# Patient Record
Sex: MALE | Race: WHITE
[De-identification: names, ages, dates, MRNs, and addresses within clinical notes are randomized per-mention and may not be internally consistent; named-entity substitution may affect disease eponyms.]

---

## 2019-06-03 ENCOUNTER — HOSPITAL ENCOUNTER (EMERGENCY)
Dept: HOSPITAL 74 - JER | Age: 44
LOS: 1 days | Discharge: HOME | End: 2019-06-04
Payer: SELF-PAY

## 2019-06-03 DIAGNOSIS — S91.214A: ICD-10-CM
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
## 2019-06-03 DIAGNOSIS — S91.211A: Primary | ICD-10-CM

## 2019-06-03 PROCEDURE — 3E033NZ INTRODUCTION OF ANALGESICS, HYPNOTICS, SEDATIVES INTO PERIPHERAL VEIN, PERCUTANEOUS APPROACH: ICD-10-PCS

## 2019-06-03 PROCEDURE — 0HQMXZZ REPAIR RIGHT FOOT SKIN, EXTERNAL APPROACH: ICD-10-PCS

## 2019-06-03 PROCEDURE — 3E0234Z INTRODUCTION OF SERUM, TOXOID AND VACCINE INTO MUSCLE, PERCUTANEOUS APPROACH: ICD-10-PCS

## 2019-06-03 PROCEDURE — 3E03329 INTRODUCTION OF OTHER ANTI-INFECTIVE INTO PERIPHERAL VEIN, PERCUTANEOUS APPROACH: ICD-10-PCS

## 2019-07-27 ENCOUNTER — HOSPITAL ENCOUNTER (EMERGENCY)
Dept: HOSPITAL 74 - JER | Age: 44
Discharge: ANOTHER HOSPITAL | End: 2019-07-27
Payer: SELF-PAY

## 2020-09-12 ENCOUNTER — HOSPITAL ENCOUNTER (EMERGENCY)
Dept: HOSPITAL 74 - JER | Age: 45
LOS: 1 days | Discharge: LEFT BEFORE DISCHARGE | End: 2020-09-13
Payer: COMMERCIAL

## 2020-09-12 VITALS — TEMPERATURE: 98.5 F

## 2020-09-12 VITALS — BODY MASS INDEX: 22.8 KG/M2

## 2020-09-12 DIAGNOSIS — R68.84: Primary | ICD-10-CM

## 2020-09-12 DIAGNOSIS — M27.2: ICD-10-CM

## 2020-09-12 PROCEDURE — 3E03329 INTRODUCTION OF OTHER ANTI-INFECTIVE INTO PERIPHERAL VEIN, PERCUTANEOUS APPROACH: ICD-10-PCS

## 2020-09-12 PROCEDURE — 3E023NZ INTRODUCTION OF ANALGESICS, HYPNOTICS, SEDATIVES INTO MUSCLE, PERCUTANEOUS APPROACH: ICD-10-PCS

## 2020-09-12 NOTE — PDOC
Attending Attestation





- Resident


Resident Name: Jeremiah Mckenzie





- ED Attending Attestation


I have performed the following: I have examined & evaluated the patient, The 

case was reviewed & discussed with the resident, I agree w/resident's findings &

plan





- HPI


HPI: 





09/13/20 00:29


Pt comes with large facial abscess on the left side of face.  He has a broken 

tooth posterior left mandible that was never pulled out. Now with infection. He 

has had 1 day of swelling.  The tooth originally broke in Feb.


Pt has no fever and no headache.


He has no PMHx.





- Physicial Exam


PE: 





09/13/20 00:31


Afebrile


exquisite tendeness of the left mandible


pt has slight pallor over the tooth #18





- Medical Decision Making





09/13/20 00:32


Pt will have IV abx, IV analgesics, labs and CT scan of his face to r/o large 

abscess pocket.


09/13/20 02:08


Labs are all WNL; pt is hangining out and talking on his cell phone


09/13/20 03:22


Patient Name: SUJEY VILLARREAL


THIS IS A PRELIMINARY REPORT


DATE OF SERVICE: 2020-09-13 02:19:13


IMAGES: 1915


EXAM: SOFT TISSUE NECK CT WITH CONTR


HISTORY: Left mandibular abscess


COMPARISON: None.


FINDINGS:


The visualized intracranial and intraorbital contents are normal. The mastoid 

air cells are well


aerated. There is moderate ethmoid sinus mucosal thickening and right sphenoid 

sinus and


bilateral maxillary sinus retention cysts or polyps.


Left perimandibular soft tissue edema likely represent cellulitis. There is a 4 

mm thick and 16 x 16


mm wide subperiosteal abscess.. There is no discrete sinus tract but there is 

minimal adjacent


dental disease.


Normal epiglottis and vocal cords. There is no parapharyngeal or retropharyngeal

space edema.


The salivary glands and thyroid glands are normal. There is no mass, suspicious 

adenopathy . The


lung apices are clear. The bones are normal.


IMPRESSION: 16 x 16 x 4 mm wide left perimandibular subperiosteal abscess and 

regional


cellulitis.


09/13/20 03:30


Pt will be transferred to [facility]


09/13/20 03:57


Pt refusing transfer. Understands that he has an abscess in the bone and that 

this requires IV abx and admission as well as transfer to [facility] for further eval.

Pt refusing admisison.  Wants to go. Will sign AMA. I will prescribe ABX for him





Discharge





- Discharge Information


Problems reviewed: Yes


Clinical Impression/Diagnosis: 


 Pain in lower jaw, Subperiosteal abscess of jaw





Condition: Stable


Disposition: AGAINST MEDICAL ADVICE





- Additional Discharge Information


Prescriptions: 


Amoxicillin/Potassium Clav [Augmentin 875-125 Tablet] 1 each PO BID 10 Days #20 

tablet





- Follow up/Referral


Referrals: 


Chelsy Martinez MD [Primary Care Provider] - 





- Patient Discharge Instructions


Patient Printed Discharge Instructions:  [facility] for Tooth Abscess


Additional Instructions: 


Today you were evaluated for jaw pain. Your CT scan show that you have a left 

perimandibular abscess that needs to be drained by a dental surgeon. You need to

go to a hospital that has dentists available for further care. We recommended 

that you be transferred directly for care, but you did not want to do this. At 

home, please continue to take Advil as needed for pain. We have sent a 

prescription for an antibiotic for you to take, please take it as instructed.





- Post Discharge Activity

## 2020-09-12 NOTE — PDOC
History of Present Illness





- General


Chief Complaint: Toothache


Stated Complaint: LEFT FACE SWELLING AND PAIN


Time Seen by Provider: 09/12/20 23:30


History Source: Patient, Old Records


Exam Limitations: No Limitations





- History of Present Illness


Initial Comments: 





09/12/20 23:31


Sujey Delaney is an otherwise healthy 45M presenting with left jaw pain.





has had toothache since February 2020


left lower jaw rear most molar


saw dentist who did some procedures that did not improve pain


unable to see new dentist due to covid-19


pain controlled on Q6hr Advil and CBD gummies


today pain unresponsive to medications, came to ED for eval





has left jaw/face swelling, very sensitive


sensation of pain near left ear


liquids only PO including soups this week, hard to chew


no chest pain, SOB, dysphagia/odynophagia, or fevers


no other symptoms other than pain





no prior PMH or PSH


NKDA





Past History





- Medical History


Allergies/Adverse Reactions: 


                                    Allergies











Allergy/AdvReac Type Severity Reaction Status Date / Time


 


No Known Allergies Allergy   Verified 07/27/19 14:20











Home Medications: 


Ambulatory Orders





Amoxicillin/Potassium Clav [Augmentin 875-125 Tablet] 1 each PO BID 10 Days #20 

tablet 09/13/20 








COPD: No





- Immunization History


Immunization Up to Date: No





- Psycho-Social/Smoking History


Smoking History: Never smoked


Have you smoked in the past 12 months: Yes


Number of Cigarettes Smoked Daily: 0





- Substance Abuse Hx (Audit-C & DAST Scrn)


How often the patient has a drink containing alcohol: Never


Score: In Men: 4 or > Positive; In Women: 3 or > Positive: 0


Screen Result (Pos requires Nsg. Audit-10AR): Negative


In the last yr the pt used illegal drug/Rx for NonMed reason: No


Score:  Yes response is considered Positive: 0


Screen Result (Positive result requires Nsg. DAST-10): Negative





**Review of Systems





- Review of Systems


Able to Perform ROS?: Yes


Constitutional: No: Symptoms Reported


HEENTM: Yes: Mouth Pain, Dental Problems, Mouth Swelling.  No: Difficulty 

Swallowing


Respiratory: No: Symptoms reported


Cardiac (ROS): No: Symptoms Reported


ABD/GI: No: Symptoms Reported


: No: Symptoms Reported


Integumentary: No: Symptoms Reported


Neurological: No: Symptoms reported


Endocrine: No: Symptoms Reported


Hematologic/Lymphatic: No: Symptoms Reported


All Other Systems: Reviewed and Negative





*Physical Exam





- Vital Signs


                                Last Vital Signs











Temp Pulse Resp BP Pulse Ox


 


 98.5 F   77   19   122/83   98 


 


 09/12/20 22:54  09/12/20 22:54  09/12/20 22:54  09/12/20 22:54  09/12/20 22:54














- Physical Exam


General Appearance: Yes: Nourished, Appropriately Dressed, Other (resting in 

bed, comfortable appearing, voice sounds normal).  No: Apparent Distress


HEENT: positive: EOMI, DARNELL, Normal Voice, Other (teeth appear grossly normal, 

tender to instrumentation of rear lower teeth).  negative: Normal ENT Inspection

(swelling to left face and jaw, very tender to palpation), Symmetrical, Pharynx 

Normal, Scleral Icterus (R), Scleral Icterus (L), Pharyngeal Erythema, Tonsillar

Exudate, Tonsillar Erythema


Neck: positive: Trachea midline, Normal Thyroid, Supple, Tender lateral (left 

side).  negative: Carotid bruit, Decreased range of motion, Stridor, Lym

phadenopathy (R), Lymphadenopathy (L)


Respiratory/Chest: positive: Lungs Clear, Normal Breath Sounds.  negative: Chest

Tender, Respiratory Distress, Accessory Muscle Use, Crackles, Rales, Rhonchi, 

Stridor, Wheezing


Cardiovascular: positive: Regular Rhythm, Regular Rate.  negative: Murmur


Gastrointestinal/Abdominal: positive: Normal Bowel Sounds, Flat, Soft.  

negative: Tender, Organomegaly, Pulsatile Mass, Guarding, Rebound


Musculoskeletal: positive: Normal Inspection.  negative: CVA Tenderness, 

Decreased Range of Motion, Vertebral Tenderness


Extremity: positive: Normal Capillary Refill, Normal Inspection, Normal Range of

Motion, Pelvis Stable.  negative: Tender, Delayed Capillary Refill, Pedal Edema,

Swelling


Integumentary: positive: Normal Color, Dry, Warm


Neurologic: positive: Fully Oriented, Alert, Normal Mood/Affect, Normal Response





ED Treatment Course





- LABORATORY


CBC & Chemistry Diagram: 


                                 09/13/20 00:35





                                 09/13/20 00:35





- RADIOLOGY


Radiograph Interpretation: 





09/13/20 03:24


Jose Waite MD wrote on Sep 13th, 2020 at 03:19 AM:


Referring Physician: ANIBAL OLGUIN


Patient Name: SUJEY DELANEY


THIS IS A PRELIMINARY REPORT


DATE OF SERVICE: 2020-09-13 02:19:13


IMAGES: 1915


EXAM: SOFT TISSUE NECK CT WITH CONTR


HISTORY: Left mandibular abscess


COMPARISON: None.


FINDINGS:


The visualized intracranial and intraorbital contents are normal. The mastoid 

air cells are well


aerated. There is moderate ethmoid sinus mucosal thickening and right sphenoid 

sinus and


bilateral maxillary sinus retention cysts or polyps.


Left perimandibular soft tissue edema likely represent cellulitis. There is a 4 

mm thick and 16 x 16


mm wide subperiosteal abscess.. There is no discrete sinus tract but there is 

minimal adjacent


dental disease.


Normal epiglottis and vocal cords. There is no parapharyngeal or retropharyngeal

space edema.


The salivary glands and thyroid glands are normal. There is no mass, suspicious 

adenopathy . The


lung apices are clear. The bones are normal.


IMPRESSION: 16 x 16 x 4 mm wide left perimandibular subperiosteal abscess and 

regional


cellulitis.





Medical Decision Making





- Medical Decision Making





09/12/20 23:31


Patient has left-sided lower jaw toothache and swelling after ~7 months 

untreated dental infection, concerned for neck soft tissue infection vs. 

abscess, no systemic symptoms, VSS. Left lower jaw tender/swollen but no obvious

fluid collection. Getting basic CBC/CMP, giving Unasyn and morphine/Toradol for 

suspected abscess, and CT soft tissue neck/facial bones with IV contrast for 

eval abscess/ostemyelitis.





09/13/20 03:44


Patient has CT left periosteal abscess with overlying cellulitis.


No OMFS at [facility].


Patient refuses to be transferred from [facility] to OSH for OMFS.


Wants to AMA from [facility], has capacity to do so.


Will send with Augmentin Rx and OMFS referral.


Patient has left AMA from ED with instructions to follow-up with OMFS.





Discharge





- Discharge Information


Problems reviewed: Yes


Clinical Impression/Diagnosis: 


 Pain in lower jaw, Subperiosteal abscess of jaw





Condition: Stable


Disposition: AGAINST MEDICAL ADVICE





- Admission


No





- Additional Discharge Information


Prescriptions: 


Amoxicillin/Potassium Clav [Augmentin 875-125 Tablet] 1 each PO BID 10 Days #20 

tablet





- Follow up/Referral


Referrals: 


Chelsy Martinez MD [Primary Care Provider] - 





- Patient Discharge Instructions


Patient Printed Discharge Instructions:  DI for Tooth Abscess


Additional Instructions: 


Today you were evaluated for jaw pain. Your CT scan show that you have a left 

perimandibular abscess that needs to be drained by a dental surgeon. You need to

go to a hospital that has dentists available for further care. We recommended 

that you be transferred directly for care, but you did not want to do this. At 

home, please continue to take Advil as needed for pain. We have sent a 

prescription for an antibiotic for you to take, please take it as instructed.





- Post Discharge Activity

## 2020-09-13 VITALS — SYSTOLIC BLOOD PRESSURE: 116 MMHG | HEART RATE: 78 BPM | DIASTOLIC BLOOD PRESSURE: 77 MMHG

## 2020-09-13 LAB
ALBUMIN SERPL-MCNC: 3.8 G/DL (ref 3.4–5)
ALP SERPL-CCNC: 66 U/L (ref 45–117)
ALT SERPL-CCNC: 16 U/L (ref 13–61)
ANION GAP SERPL CALC-SCNC: 5 MMOL/L (ref 8–16)
AST SERPL-CCNC: 22 U/L (ref 15–37)
BASOPHILS # BLD: 0.3 % (ref 0–2)
BILIRUB SERPL-MCNC: 0.6 MG/DL (ref 0.2–1)
BUN SERPL-MCNC: 10 MG/DL (ref 7–18)
CALCIUM SERPL-MCNC: 8.4 MG/DL (ref 8.5–10.1)
CHLORIDE SERPL-SCNC: 108 MMOL/L (ref 98–107)
CO2 SERPL-SCNC: 30 MMOL/L (ref 21–32)
CREAT SERPL-MCNC: 0.9 MG/DL (ref 0.55–1.3)
DEPRECATED RDW RBC AUTO: 13.2 % (ref 11.9–15.9)
EOSINOPHIL # BLD: 1.1 % (ref 0–4.5)
GLUCOSE SERPL-MCNC: 92 MG/DL (ref 74–106)
HCT VFR BLD CALC: 38 % (ref 35.4–49)
HGB BLD-MCNC: 13.1 GM/DL (ref 11.7–16.9)
LYMPHOCYTES # BLD: 14.1 % (ref 8–40)
MCH RBC QN AUTO: 31.9 PG (ref 25.7–33.7)
MCHC RBC AUTO-ENTMCNC: 34.5 G/DL (ref 32–35.9)
MCV RBC: 92.5 FL (ref 80–96)
MONOCYTES # BLD AUTO: 6.1 % (ref 3.8–10.2)
NEUTROPHILS # BLD: 78.4 % (ref 42.8–82.8)
PLATELET # BLD AUTO: 206 K/MM3 (ref 134–434)
PMV BLD: 9.6 FL (ref 7.5–11.1)
POTASSIUM SERPLBLD-SCNC: 3.7 MMOL/L (ref 3.5–5.1)
PROT SERPL-MCNC: 6.7 G/DL (ref 6.4–8.2)
RBC # BLD AUTO: 4.11 M/MM3 (ref 4–5.6)
SODIUM SERPL-SCNC: 142 MMOL/L (ref 136–145)
WBC # BLD AUTO: 8.9 K/MM3 (ref 4–10)

## 2021-10-13 ENCOUNTER — HOSPITAL ENCOUNTER (EMERGENCY)
Dept: HOSPITAL 74 - JERFT | Age: 46
Discharge: HOME | End: 2021-10-13
Payer: COMMERCIAL

## 2021-10-13 VITALS — HEART RATE: 86 BPM | TEMPERATURE: 97.5 F | DIASTOLIC BLOOD PRESSURE: 79 MMHG | SYSTOLIC BLOOD PRESSURE: 120 MMHG

## 2021-10-13 VITALS — BODY MASS INDEX: 20.2 KG/M2

## 2021-10-13 DIAGNOSIS — S99.922A: Primary | ICD-10-CM
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
## 2021-10-13 DIAGNOSIS — Z59.01: ICD-10-CM
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
## 2021-10-13 PROCEDURE — 3E0233Z INTRODUCTION OF ANTI-INFLAMMATORY INTO MUSCLE, PERCUTANEOUS APPROACH: ICD-10-PCS

## 2021-10-13 SDOH — ECONOMIC STABILITY - HOUSING INSECURITY: SHELTERED HOMELESSNESS: Z59.01

## 2022-06-24 ENCOUNTER — HOSPITAL ENCOUNTER (EMERGENCY)
Dept: HOSPITAL 74 - JER | Age: 47
Discharge: HOME | End: 2022-06-24
Payer: COMMERCIAL

## 2022-06-24 VITALS — HEART RATE: 94 BPM | SYSTOLIC BLOOD PRESSURE: 111 MMHG | TEMPERATURE: 97.9 F | DIASTOLIC BLOOD PRESSURE: 53 MMHG

## 2022-06-24 VITALS — BODY MASS INDEX: 23.5 KG/M2

## 2022-06-24 DIAGNOSIS — R21: Primary | ICD-10-CM

## 2022-06-24 LAB
ALBUMIN SERPL-MCNC: 4.4 G/DL (ref 3.4–5)
ALP SERPL-CCNC: 91 U/L (ref 45–117)
ALT SERPL-CCNC: 46 U/L (ref 13–61)
ANION GAP SERPL CALC-SCNC: 8 MMOL/L (ref 8–16)
ANISOCYTOSIS BLD QL: (no result)
AST SERPL-CCNC: 36 U/L (ref 15–37)
BILIRUB SERPL-MCNC: 0.7 MG/DL (ref 0.2–1)
BUN SERPL-MCNC: 14.3 MG/DL (ref 7–18)
CALCIUM SERPL-MCNC: 9 MG/DL (ref 8.5–10.1)
CHLORIDE SERPL-SCNC: 101 MMOL/L (ref 98–107)
CO2 SERPL-SCNC: 30 MMOL/L (ref 21–32)
CREAT SERPL-MCNC: 1.1 MG/DL (ref 0.55–1.3)
DEPRECATED RDW RBC AUTO: 12.8 % (ref 11.9–15.9)
GLUCOSE SERPL-MCNC: 78 MG/DL (ref 74–106)
HCT VFR BLD CALC: 43.1 % (ref 35.4–49)
HGB BLD-MCNC: 14.9 GM/DL (ref 11.7–16.9)
MACROCYTES BLD QL: 0
MCH RBC QN AUTO: 31.1 PG (ref 25.7–33.7)
MCHC RBC AUTO-ENTMCNC: 34.5 G/DL (ref 32–35.9)
MCV RBC: 90.2 FL (ref 80–96)
PLATELET # BLD AUTO: 186 10^3/UL (ref 134–434)
PMV BLD: 8.4 FL (ref 7.5–11.1)
PROT SERPL-MCNC: 7.8 G/DL (ref 6.4–8.2)
RBC # BLD AUTO: 4.78 M/MM3 (ref 4–5.6)
SODIUM SERPL-SCNC: 139 MMOL/L (ref 136–145)
WBC # BLD AUTO: 5 K/MM3 (ref 4–10)

## 2022-06-24 PROCEDURE — 3E033GC INTRODUCTION OF OTHER THERAPEUTIC SUBSTANCE INTO PERIPHERAL VEIN, PERCUTANEOUS APPROACH: ICD-10-PCS
